# Patient Record
Sex: FEMALE | Race: BLACK OR AFRICAN AMERICAN | NOT HISPANIC OR LATINO | Employment: STUDENT | URBAN - METROPOLITAN AREA
[De-identification: names, ages, dates, MRNs, and addresses within clinical notes are randomized per-mention and may not be internally consistent; named-entity substitution may affect disease eponyms.]

---

## 2024-02-23 ENCOUNTER — APPOINTMENT (OUTPATIENT)
Dept: PRIMARY CARE | Facility: CLINIC | Age: 29
End: 2024-02-23
Payer: MEDICAID

## 2024-03-22 ENCOUNTER — HOSPITAL ENCOUNTER (EMERGENCY)
Age: 29
Discharge: LEFT BEFORE DISCHARGE | End: 2024-03-22

## 2024-03-22 ENCOUNTER — APPOINTMENT (OUTPATIENT)
Dept: ULTRASOUND IMAGING | Age: 29
End: 2024-03-22

## 2024-03-22 VITALS
HEART RATE: 89 BPM | DIASTOLIC BLOOD PRESSURE: 88 MMHG | OXYGEN SATURATION: 100 % | HEIGHT: 67 IN | WEIGHT: 210 LBS | SYSTOLIC BLOOD PRESSURE: 160 MMHG | RESPIRATION RATE: 18 BRPM | BODY MASS INDEX: 32.96 KG/M2 | TEMPERATURE: 98.2 F

## 2024-03-22 DIAGNOSIS — Z53.21 ELOPED FROM EMERGENCY DEPARTMENT: Primary | ICD-10-CM

## 2024-03-22 LAB
ALBUMIN SERPL-MCNC: 4.1 G/DL (ref 3.5–5.2)
ALP SERPL-CCNC: 74 U/L (ref 35–104)
ALT SERPL-CCNC: 27 U/L (ref 0–32)
ANION GAP SERPL CALCULATED.3IONS-SCNC: 13 MMOL/L (ref 7–16)
AST SERPL-CCNC: 31 U/L (ref 0–31)
B-HCG SERPL EIA 3RD IS-ACNC: 879.7 MIU/ML (ref 0–7)
BILIRUB SERPL-MCNC: 0.2 MG/DL (ref 0–1.2)
BILIRUB UR QL STRIP: NEGATIVE
BUN SERPL-MCNC: 6 MG/DL (ref 6–20)
CALCIUM SERPL-MCNC: 9.4 MG/DL (ref 8.6–10.2)
CHLORIDE SERPL-SCNC: 102 MMOL/L (ref 98–107)
CLARITY UR: CLEAR
CO2 SERPL-SCNC: 19 MMOL/L (ref 22–29)
COLOR UR: YELLOW
CREAT SERPL-MCNC: 0.6 MG/DL (ref 0.5–1)
GFR SERPL CREATININE-BSD FRML MDRD: >60 ML/MIN/1.73M2
GLUCOSE SERPL-MCNC: 86 MG/DL (ref 74–99)
GLUCOSE UR STRIP-MCNC: NEGATIVE MG/DL
HGB UR QL STRIP.AUTO: NEGATIVE
KETONES UR STRIP-MCNC: NEGATIVE MG/DL
LEUKOCYTE ESTERASE UR QL STRIP: NEGATIVE
NITRITE UR QL STRIP: NEGATIVE
PH UR STRIP: 6 [PH] (ref 5–9)
POTASSIUM SERPL-SCNC: 4.8 MMOL/L (ref 3.5–5)
PROT SERPL-MCNC: 8.4 G/DL (ref 6.4–8.3)
PROT UR STRIP-MCNC: NEGATIVE MG/DL
RBC #/AREA URNS HPF: NORMAL /HPF
SODIUM SERPL-SCNC: 134 MMOL/L (ref 132–146)
SP GR UR STRIP: 1.02 (ref 1–1.03)
UROBILINOGEN UR STRIP-ACNC: 0.2 EU/DL (ref 0–1)
WBC #/AREA URNS HPF: NORMAL /HPF

## 2024-03-22 PROCEDURE — 80053 COMPREHEN METABOLIC PANEL: CPT

## 2024-03-22 PROCEDURE — 84702 CHORIONIC GONADOTROPIN TEST: CPT

## 2024-03-22 PROCEDURE — 81001 URINALYSIS AUTO W/SCOPE: CPT

## 2024-03-22 PROCEDURE — 76801 OB US < 14 WKS SINGLE FETUS: CPT

## 2024-03-22 PROCEDURE — 99284 EMERGENCY DEPT VISIT MOD MDM: CPT

## 2024-03-22 NOTE — ED NOTES
Department of Emergency Medicine  FIRST PROVIDER TRIAGE NOTE             Independent MLP           3/22/24  5:10 PM EDT    Date of Encounter: 3/22/24   MRN: [id number]      HPI: Mar Kenney is a 28 y.o. female who presents to the ED for Abdominal Cramping (Generalized abd cramping, recently found out she was pregnant on Monday. Cramping onset Tuesday. Denies vaginal bleeding. )    SHE STATES SHE WAS AT AN APPOINTMENT TO GET BIRTH CONTROL AND THE URINE PREGNANCY TEST WAS POSITIVE ON MONDAY.     ROS: Negative for cp or sob.    PE: Gen Appearance/Constitutional: alert  HEENT: NC/NT. PERRLA,  Airway patent.     Initial Plan of Care: All treatment areas with department are currently occupied. Plan to order/Initiate the following while awaiting opening in ED: ultrasound.  Initiate Treatment-Testing, Proceed toTreatment Area When Bed Available for ED Attending/MLP to Continue Care    Electronically signed by GALINA Bradford CNP   DD: 3/22/24      Ramon Gutierrez APRN - CNP  03/22/24 0256

## 2024-03-23 NOTE — ED PROVIDER NOTES
Mar is a 28-year-old female presenting to the ED for abdominal cramping however patient eloped from the department prior to my evaluation.  Had attempted to evaluate patient and discussed with her her symptoms however she was eloped from the department prior to my knowledge.  Workup had been ordered by midlevel who did evaluate patient previously which revealed unremarkable urinalysis, CMP unremarkable and hCG of 879.  Ultrasound resulted after patient had eloped revealing no identifiable IUP.  Unable to discuss any of this with the patient as she had already eloped from the department.    --------------------------------- ADDITIONAL PROVIDER NOTES ---------------------------------  3:08 AM EDT  Patient appears to have eloped from the emergency department. Multiple attempts were made to locate the patient but were unsuccessful.    Patient was unable to be provided with results, plan for further treatment, and recommendations regarding need for follow-up or return to the emergency department due to leaving prior to final results review and disposition discussion.     There are no discharge medications for this patient.      Diagnosis:  1. Eloped from emergency department        Disposition:  Patient's disposition: Eloped.  Patient's condition is Unknown.

## 2024-05-24 ENCOUNTER — APPOINTMENT (OUTPATIENT)
Dept: PRIMARY CARE | Facility: CLINIC | Age: 29
End: 2024-05-24
Payer: MEDICAID

## 2024-07-16 ENCOUNTER — APPOINTMENT (OUTPATIENT)
Dept: ULTRASOUND IMAGING | Age: 29
End: 2024-07-16
Payer: MEDICAID

## 2024-07-16 ENCOUNTER — HOSPITAL ENCOUNTER (EMERGENCY)
Age: 29
Discharge: HOME | End: 2024-07-16
Payer: MEDICAID

## 2024-07-16 VITALS
OXYGEN SATURATION: 93 % | SYSTOLIC BLOOD PRESSURE: 129 MMHG | TEMPERATURE: 97.7 F | BODY MASS INDEX: 34.55 KG/M2 | DIASTOLIC BLOOD PRESSURE: 96 MMHG | RESPIRATION RATE: 16 BRPM | HEART RATE: 78 BPM | WEIGHT: 215 LBS | HEIGHT: 66 IN

## 2024-07-16 DIAGNOSIS — O46.8X1 SUBCHORIONIC HEMATOMA IN FIRST TRIMESTER, SINGLE OR UNSPECIFIED FETUS: ICD-10-CM

## 2024-07-16 DIAGNOSIS — O34.10 UTERINE FIBROID IN PREGNANCY: ICD-10-CM

## 2024-07-16 DIAGNOSIS — D25.9 UTERINE FIBROID IN PREGNANCY: ICD-10-CM

## 2024-07-16 DIAGNOSIS — O41.8X10 SUBCHORIONIC HEMATOMA IN FIRST TRIMESTER, SINGLE OR UNSPECIFIED FETUS: ICD-10-CM

## 2024-07-16 DIAGNOSIS — O26.899 ABDOMINAL CRAMPING AFFECTING PREGNANCY: Primary | ICD-10-CM

## 2024-07-16 DIAGNOSIS — R10.9 ABDOMINAL CRAMPING AFFECTING PREGNANCY: Primary | ICD-10-CM

## 2024-07-16 LAB
ABO + RH BLD: NORMAL
ALBUMIN SERPL-MCNC: 3.7 G/DL (ref 3.5–5.2)
ALP SERPL-CCNC: 62 U/L (ref 35–104)
ALT SERPL-CCNC: 17 U/L (ref 0–32)
ANION GAP SERPL CALCULATED.3IONS-SCNC: 9 MMOL/L (ref 7–16)
ARM BAND NUMBER: NORMAL
AST SERPL-CCNC: 17 U/L (ref 0–31)
B-HCG SERPL EIA 3RD IS-ACNC: 3080 MIU/ML (ref 0–7)
BACTERIA URNS QL MICRO: ABNORMAL
BASOPHILS # BLD: 0.05 K/UL (ref 0–0.2)
BASOPHILS NFR BLD: 1 % (ref 0–2)
BILIRUB SERPL-MCNC: 0.2 MG/DL (ref 0–1.2)
BILIRUB UR QL STRIP: NEGATIVE
BLOOD BANK SAMPLE EXPIRATION: NORMAL
BLOOD GROUP ANTIBODIES SERPL: NEGATIVE
BUN SERPL-MCNC: 7 MG/DL (ref 6–20)
CALCIUM SERPL-MCNC: 8.6 MG/DL (ref 8.6–10.2)
CHLORIDE SERPL-SCNC: 105 MMOL/L (ref 98–107)
CLARITY UR: CLEAR
CO2 SERPL-SCNC: 23 MMOL/L (ref 22–29)
COLOR UR: YELLOW
CREAT SERPL-MCNC: 0.7 MG/DL (ref 0.5–1)
EOSINOPHIL # BLD: 0.07 K/UL (ref 0.05–0.5)
EOSINOPHILS RELATIVE PERCENT: 1 % (ref 0–6)
ERYTHROCYTE [DISTWIDTH] IN BLOOD BY AUTOMATED COUNT: 13.5 % (ref 11.5–15)
GFR, ESTIMATED: >90 ML/MIN/1.73M2
GLUCOSE SERPL-MCNC: 93 MG/DL (ref 74–99)
GLUCOSE UR STRIP-MCNC: NEGATIVE MG/DL
HCG, URINE, POC: POSITIVE
HCT VFR BLD AUTO: 42.3 % (ref 34–48)
HGB BLD-MCNC: 13.4 G/DL (ref 11.5–15.5)
HGB UR QL STRIP.AUTO: NEGATIVE
IMM GRANULOCYTES # BLD AUTO: <0.03 K/UL (ref 0–0.58)
IMM GRANULOCYTES NFR BLD: 0 % (ref 0–5)
KETONES UR STRIP-MCNC: NEGATIVE MG/DL
LEUKOCYTE ESTERASE UR QL STRIP: ABNORMAL
LIPASE SERPL-CCNC: 39 U/L (ref 13–60)
LYMPHOCYTES NFR BLD: 1.99 K/UL (ref 1.5–4)
LYMPHOCYTES RELATIVE PERCENT: 31 % (ref 20–42)
Lab: NORMAL
MCH RBC QN AUTO: 27.5 PG (ref 26–35)
MCHC RBC AUTO-ENTMCNC: 31.7 G/DL (ref 32–34.5)
MCV RBC AUTO: 86.7 FL (ref 80–99.9)
MONOCYTES NFR BLD: 0.66 K/UL (ref 0.1–0.95)
MONOCYTES NFR BLD: 10 % (ref 2–12)
NEGATIVE QC PASS/FAIL: NORMAL
NEUTROPHILS NFR BLD: 57 % (ref 43–80)
NEUTS SEG NFR BLD: 3.72 K/UL (ref 1.8–7.3)
NITRITE UR QL STRIP: NEGATIVE
PH UR STRIP: 7 [PH] (ref 5–9)
PLATELET # BLD AUTO: 359 K/UL (ref 130–450)
PMV BLD AUTO: 9.7 FL (ref 7–12)
POSITIVE QC PASS/FAIL: NORMAL
POTASSIUM SERPL-SCNC: 4.5 MMOL/L (ref 3.5–5)
PROT SERPL-MCNC: 7.1 G/DL (ref 6.4–8.3)
PROT UR STRIP-MCNC: NEGATIVE MG/DL
RBC # BLD AUTO: 4.88 M/UL (ref 3.5–5.5)
RBC #/AREA URNS HPF: ABNORMAL /HPF
SODIUM SERPL-SCNC: 137 MMOL/L (ref 132–146)
SP GR UR STRIP: 1.01 (ref 1–1.03)
UROBILINOGEN UR STRIP-ACNC: 0.2 EU/DL (ref 0–1)
WBC #/AREA URNS HPF: ABNORMAL /HPF
WBC OTHER # BLD: 6.5 K/UL (ref 4.5–11.5)

## 2024-07-16 PROCEDURE — 86850 RBC ANTIBODY SCREEN: CPT

## 2024-07-16 PROCEDURE — 80053 COMPREHEN METABOLIC PANEL: CPT

## 2024-07-16 PROCEDURE — 86901 BLOOD TYPING SEROLOGIC RH(D): CPT

## 2024-07-16 PROCEDURE — 99284 EMERGENCY DEPT VISIT MOD MDM: CPT

## 2024-07-16 PROCEDURE — 86900 BLOOD TYPING SEROLOGIC ABO: CPT

## 2024-07-16 PROCEDURE — 2580000003 HC RX 258: Performed by: PHYSICIAN ASSISTANT

## 2024-07-16 PROCEDURE — 76817 TRANSVAGINAL US OBSTETRIC: CPT

## 2024-07-16 PROCEDURE — 83690 ASSAY OF LIPASE: CPT

## 2024-07-16 PROCEDURE — 81001 URINALYSIS AUTO W/SCOPE: CPT

## 2024-07-16 PROCEDURE — 85025 COMPLETE CBC W/AUTO DIFF WBC: CPT

## 2024-07-16 PROCEDURE — 84702 CHORIONIC GONADOTROPIN TEST: CPT

## 2024-07-16 RX ORDER — 0.9 % SODIUM CHLORIDE 0.9 %
1000 INTRAVENOUS SOLUTION INTRAVENOUS ONCE
Status: COMPLETED | OUTPATIENT
Start: 2024-07-16 | End: 2024-07-16

## 2024-07-16 RX ADMIN — SODIUM CHLORIDE 1000 ML: 9 INJECTION, SOLUTION INTRAVENOUS at 09:53

## 2024-07-16 ASSESSMENT — LIFESTYLE VARIABLES
HOW OFTEN DO YOU HAVE A DRINK CONTAINING ALCOHOL: NEVER
HOW MANY STANDARD DRINKS CONTAINING ALCOHOL DO YOU HAVE ON A TYPICAL DAY: PATIENT DOES NOT DRINK

## 2024-07-16 ASSESSMENT — PAIN - FUNCTIONAL ASSESSMENT: PAIN_FUNCTIONAL_ASSESSMENT: NONE - DENIES PAIN

## 2024-07-16 NOTE — ED PROVIDER NOTES
follow-up on ED visit and establish OB/GYN    Weston Graves MD  [address] [address]  [phone]    Call in 1 day  for follow-up on ED visit and to establish OB/GYN    Nearest ED    Go to   as needed, if symptoms worsen      Electronically signed by Nita Cross PA-C   DD: 7/16/24  I am the Primary Clinician of Record.    **This report was transcribed using voice recognition software. Every effort was made to ensure accuracy; however, inadvertent computerized transcription errors may be present.    END OF PROVIDER NOTE        Nita Cross PA-C  07/16/24 3243

## 2024-07-17 ENCOUNTER — TELEPHONE (OUTPATIENT)
Dept: OBGYN | Age: 29
End: 2024-07-17

## 2024-07-17 NOTE — TELEPHONE ENCOUNTER
Patient was in the ED yesterday and had an u/s. Was diagnosed with subchoronic hemorrhage and fibroid. She saw something about an irregular shaped sac. Sees her local ob/gyn next Thursday in [place]. Wants to know if that is something to worry about and if she needs seen sooner. Should she schedule with you? After u/s she went to [facility] after because after the u/s she started bleeding and was told it was not a significant amount and not indictive of a miscarriage.

## 2024-08-05 ENCOUNTER — HOSPITAL ENCOUNTER (EMERGENCY)
Age: 29
Discharge: HOME | End: 2024-08-05
Payer: MEDICAID

## 2024-08-05 VITALS
DIASTOLIC BLOOD PRESSURE: 93 MMHG | BODY MASS INDEX: 35.31 KG/M2 | HEIGHT: 67 IN | SYSTOLIC BLOOD PRESSURE: 137 MMHG | RESPIRATION RATE: 14 BRPM | HEART RATE: 87 BPM | OXYGEN SATURATION: 100 % | TEMPERATURE: 98.7 F | WEIGHT: 225 LBS

## 2024-08-05 DIAGNOSIS — O16.1 ELEVATED BLOOD PRESSURE AFFECTING PREGNANCY IN FIRST TRIMESTER, ANTEPARTUM: ICD-10-CM

## 2024-08-05 DIAGNOSIS — O20.8 SUBCHORIONIC HEMORRHAGE IN FIRST TRIMESTER: ICD-10-CM

## 2024-08-05 DIAGNOSIS — O46.90 VAGINAL BLEEDING IN PREGNANCY: ICD-10-CM

## 2024-08-05 DIAGNOSIS — N93.9: Primary | ICD-10-CM

## 2024-08-05 LAB
B-HCG SERPL EIA 3RD IS-ACNC: ABNORMAL MIU/ML (ref 0–7)
BASOPHILS # BLD: 0.03 K/UL (ref 0–0.2)
BASOPHILS NFR BLD: 0 % (ref 0–2)
BILIRUB UR QL STRIP: NEGATIVE
CLARITY UR: CLEAR
CLUE CELLS VAG QL WET PREP: NORMAL
COLOR UR: YELLOW
EOSINOPHIL # BLD: 0.07 K/UL (ref 0.05–0.5)
EOSINOPHILS RELATIVE PERCENT: 1 % (ref 0–6)
EPI CELLS #/AREA URNS HPF: ABNORMAL /HPF
ERYTHROCYTE [DISTWIDTH] IN BLOOD BY AUTOMATED COUNT: 13.8 % (ref 11.5–15)
GLUCOSE UR STRIP-MCNC: NEGATIVE MG/DL
HCT VFR BLD AUTO: 39.8 % (ref 34–48)
HGB BLD-MCNC: 13 G/DL (ref 11.5–15.5)
HGB UR QL STRIP.AUTO: ABNORMAL
IMM GRANULOCYTES # BLD AUTO: 0.04 K/UL (ref 0–0.58)
IMM GRANULOCYTES NFR BLD: 1 % (ref 0–5)
KETONES UR STRIP-MCNC: NEGATIVE MG/DL
LEUKOCYTE ESTERASE UR QL STRIP: NEGATIVE
LYMPHOCYTES NFR BLD: 3.13 K/UL (ref 1.5–4)
LYMPHOCYTES RELATIVE PERCENT: 37 % (ref 20–42)
MCHC RBC AUTO-ENTMCNC: 32.7 G/DL (ref 32–34.5)
MCV RBC AUTO: 85.4 FL (ref 80–99.9)
MONOCYTES NFR BLD: 0.83 K/UL (ref 0.1–0.95)
MONOCYTES NFR BLD: 10 % (ref 2–12)
NEUTROPHILS NFR BLD: 52 % (ref 43–80)
NEUTS SEG NFR BLD: 4.48 K/UL (ref 1.8–7.3)
NITRITE UR QL STRIP: NEGATIVE
PH UR STRIP: 7 [PH] (ref 5–9)
PLATELET # BLD AUTO: 323 K/UL (ref 130–450)
PMV BLD AUTO: 9.7 FL (ref 7–12)
PROT UR STRIP-MCNC: NEGATIVE MG/DL
RBC # BLD AUTO: 4.66 M/UL (ref 3.5–5.5)
RBC #/AREA URNS HPF: ABNORMAL /HPF
SOURCE WET PREP: NORMAL
SP GR UR STRIP: 1.01 (ref 1–1.03)
T VAGINALIS VAG QL WET PREP: NORMAL
UROBILINOGEN UR STRIP-ACNC: 0.2 EU/DL (ref 0–1)
WBC #/AREA URNS HPF: ABNORMAL /HPF
WBC OTHER # BLD: 8.6 K/UL (ref 4.5–11.5)
YEAST WET PREP: NORMAL

## 2024-08-05 PROCEDURE — 81001 URINALYSIS AUTO W/SCOPE: CPT

## 2024-08-05 PROCEDURE — 99283 EMERGENCY DEPT VISIT LOW MDM: CPT

## 2024-08-05 PROCEDURE — 85025 COMPLETE CBC W/AUTO DIFF WBC: CPT

## 2024-08-05 PROCEDURE — 84702 CHORIONIC GONADOTROPIN TEST: CPT

## 2024-08-05 PROCEDURE — 87491 CHLMYD TRACH DNA AMP PROBE: CPT

## 2024-08-05 PROCEDURE — 87591 N.GONORRHOEAE DNA AMP PROB: CPT

## 2024-08-05 PROCEDURE — 87210 SMEAR WET MOUNT SALINE/INK: CPT

## 2024-08-05 ASSESSMENT — PAIN - FUNCTIONAL ASSESSMENT
PAIN_FUNCTIONAL_ASSESSMENT: NONE - DENIES PAIN
PAIN_FUNCTIONAL_ASSESSMENT: NONE - DENIES PAIN

## 2024-08-05 NOTE — ED PROVIDER NOTES
Leukocyte Esterase, Urine NEGATIVE NEGATIVE    WBC, UA 0 TO 5 0 TO 5 /HPF    RBC, UA 3 to 5 (A) 0 TO 2 /HPF    Epithelial Cells, UA 0 TO 2 /HPF   hCG, quantitative, pregnancy   Result Value Ref Range    hCG Quant 57,205.0 (H) 0 - 7 mIU/mL       RADIOLOGY:   Images reviewed but not interpreted by the ED provider. Interpretation per the Radiologist below, if available at the time of this note:    No orders to display       PROCEDURES      None    CRITICAL CARE TIME (.cctime)     none    CC/HPI, ED COURSE, DIFFERENTIAL DIAGNOSIS, MDM & REEVALUATION:   ED Course:  ED Course as of 24 0324   Mon Aug 05, 2024   0245 Reviewed results and plan of care.  Reiterated pelvic rest details, follow-up, return precautions.  She verbalized understanding [DH]      ED Course User Index  [DH] Danielle Cardenas APRN - CNP       Consultations: none    Reevaluation & Disposition Considerations:  The nursing notes within the ED encounter and vital signs as below have been reviewed.   Vitals:    24 0119 24 0229   BP: (!) 150/96 (!) 137/93   Pulse: (!) 104 87   Resp: 16 14   Temp: 98.8 °F (37.1 °C) 98.7 °F (37.1 °C)   TempSrc: Oral Oral   SpO2: 99% 100%   Weight: 102.1 kg (225 lb)    Height: 1.702 m (5' 7\")        Medical Decision Making:  History from : Patient and chart review  Limitations to history : None    Mar Kenney is a 28 y.o.  female who is currently 7 weeks pregnant presenting for spotty to small amount of painless bright red vaginal bleeding which started after having sexual intercourse this evening.  Since onset the symptoms have been stable and mild in severity.  Symptoms are associated with no additional symptoms and denies any abdominal pain, back pain, fever, vomiting, cloudy urine, urinary frequency, dysuria, hematuria, urinary urgency, vaginal discharge, or passage of tissue.  She was treated for bacterial vaginosis with vaginal and yeast vaginitis just over a week ago. No history of PID, STD's.

## 2024-08-06 LAB
C TRACH DNA SPEC QL PROBE+SIG AMP: NEGATIVE
N GONORRHOEA DNA SPEC QL PROBE+SIG AMP: NEGATIVE
SPECIMEN DESCRIPTION: NORMAL

## 2024-11-30 ENCOUNTER — HOSPITAL ENCOUNTER (OUTPATIENT)
Age: 29
Setting detail: OBSERVATION
Discharge: HOME | End: 2024-12-01
Attending: STUDENT IN AN ORGANIZED HEALTH CARE EDUCATION/TRAINING PROGRAM | Admitting: STUDENT IN AN ORGANIZED HEALTH CARE EDUCATION/TRAINING PROGRAM
Payer: MEDICAID

## 2024-11-30 VITALS — HEIGHT: 67 IN | WEIGHT: 234 LBS | BODY MASS INDEX: 36.73 KG/M2

## 2024-11-30 PROBLEM — O26.899 PELVIC PAIN DURING PREGNANCY: Status: ACTIVE | Noted: 2024-11-30

## 2024-11-30 PROBLEM — R10.2 PELVIC PAIN DURING PREGNANCY: Status: ACTIVE | Noted: 2024-11-30

## 2024-11-30 LAB
BACTERIA URNS QL MICRO: ABNORMAL
BILIRUB UR QL STRIP: NEGATIVE
CLARITY UR: CLEAR
COLOR UR: YELLOW
GLUCOSE UR STRIP-MCNC: NEGATIVE MG/DL
HGB UR QL STRIP.AUTO: NEGATIVE
KETONES UR STRIP-MCNC: NEGATIVE MG/DL
LEUKOCYTE ESTERASE UR QL STRIP: NEGATIVE
MUCOUS THREADS URNS QL MICRO: PRESENT
NITRITE UR QL STRIP: NEGATIVE
PH UR STRIP: 6.5 [PH] (ref 5–9)
PROT UR STRIP-MCNC: NEGATIVE MG/DL
RBC #/AREA URNS HPF: ABNORMAL /HPF
SP GR UR STRIP: 1.02 (ref 1–1.03)
UROBILINOGEN UR STRIP-ACNC: 0.2 EU/DL (ref 0–1)
WBC #/AREA URNS HPF: ABNORMAL /HPF

## 2024-11-30 PROCEDURE — G0378 HOSPITAL OBSERVATION PER HR: HCPCS

## 2024-11-30 PROCEDURE — 87086 URINE CULTURE/COLONY COUNT: CPT

## 2024-11-30 PROCEDURE — 81001 URINALYSIS AUTO W/SCOPE: CPT

## 2024-11-30 PROCEDURE — 99222 1ST HOSP IP/OBS MODERATE 55: CPT | Performed by: MIDWIFE

## 2024-11-30 RX ORDER — ASPIRIN 81 MG/1
81 TABLET, CHEWABLE ORAL DAILY
COMMUNITY

## 2024-12-01 PROCEDURE — 99212 OFFICE O/P EST SF 10 MIN: CPT

## 2024-12-01 RX ORDER — NITROFURANTOIN 25; 75 MG/1; MG/1
100 CAPSULE ORAL 2 TIMES DAILY
Qty: 14 CAPSULE | Refills: 0 | Status: SHIPPED | OUTPATIENT
Start: 2024-12-01 | End: 2024-12-01

## 2024-12-01 RX ORDER — NITROFURANTOIN 25; 75 MG/1; MG/1
100 CAPSULE ORAL 2 TIMES DAILY
Qty: 14 CAPSULE | Refills: 0 | Status: SHIPPED | OUTPATIENT
Start: 2024-12-01 | End: 2024-12-08

## 2024-12-01 NOTE — PROGRESS NOTES
Discharge paperwork and prescription provided to patient. Patient verbalizes understanding. States she has no questions at this time. Ambulated off unit to home with significant other.

## 2024-12-01 NOTE — H&P
Department of Obstetrics and Gynecology  Nurse Practitioner Obstetrics History and Physical        CHIEF COMPLAINT:  left groin and low back pain    HISTORY OF PRESENT ILLNESS:  Mar Kenney is a 29 y.o. female , Patient's last menstrual period was 2024 (approximate).,  at 24w4d.     Presents to L&D with left groin and low back pain.  States she went shopping on Friday and pain started that evening.  States it is not worse with turning over in bed but does think it is worse when she is up walking.  Pain is intermittent, over SI joint on left side and lateral to SP in the front.  \"Feels like stretching\".  Denies dysuria, urgency or frequency.  Denies constipation or diarrhea.  Feels fetal movement.  Pregnancy has been uncomplicated thus far.        OB History          1    Para   0    Term   0       0    AB   0    Living   0         SAB   0    IAB   0    Ectopic   0    Molar   0    Multiple   0    Live Births   0                Estimated Due Date: Estimated Date of Delivery: 3/18/25      Pregnancy complicated by: There is no problem list on file for this patient.          PAST OB HISTORY  OB History          1    Para   0    Term   0       0    AB   0    Living   0         SAB   0    IAB   0    Ectopic   0    Molar   0    Multiple   0    Live Births   0                  Past Medical History:      History reviewed. No pertinent past medical history.    Past Surgical History:      History reviewed. No pertinent surgical history.    Social History:    TOBACCO:   reports that she has never smoked. She has never used smokeless tobacco.  ETOH:   reports that she does not currently use alcohol.  DRUGS:   reports no history of drug use.  Family History:   History reviewed. No pertinent family history.    Medications Prior to Admission:  Medications Prior to Admission: aspirin 81 MG chewable tablet, Take 1 tablet by mouth daily  Prenatal MV-Min-Fe Fum-FA-DHA (PRENATAL 1 PO),

## 2024-12-01 NOTE — PROGRESS NOTES
24w4d arrived to unit for left sided groin and back pain. Denies VB< LOF, or ctx. Reports no complications during this pregnancy. Placed on EFM. VSS. Call light within reach.

## 2024-12-02 LAB
MICROORGANISM SPEC CULT: ABNORMAL
MICROORGANISM SPEC CULT: ABNORMAL
SERVICE CMNT-IMP: ABNORMAL
SPECIMEN DESCRIPTION: ABNORMAL